# Patient Record
(demographics unavailable — no encounter records)

---

## 2024-11-18 NOTE — PLAN
[FreeTextEntry1] : 44 year old for an annual.  Health care maintenance -pap -menopause hormones drawn  -vit D/exercise/calcium -mammo/sono utd -colon screening reviewed -f/u PCP for annual and appropriate immunizations -rto 1 year

## 2024-11-18 NOTE — END OF VISIT
[FreeTextEntry3] : I, Lou Ann, acted as a scribe on behalf of Dr. Gris Langford M.D. on 11/13/2024.   All medical entries made by the scribe were at my, Dr. Gris Langford M.D., direction and personally dictated by me on 11/13/2024. I have reviewed the chart and agree that the record accurately reflects my personal performance of the history, physical exam, assessment and plan. I have also personally directed, reviewed, and agreed with the chart.

## 2024-11-18 NOTE — HISTORY OF PRESENT ILLNESS
[FreeTextEntry1] : 44 year old  presents for an annual. She is doing well but c/o insomnia and occasional night sweats that began over a year ago. Pt has borderline hypothyroid, follows with endo.  OBHx: SAB (no D&C),  x2 (, ) GYNHx: fibroids, ovarian cysts, h/o abnml PAP s/p colpo (), uses condoms PSHx: S/p TLH, BS, Cysto on 23. FamHx: uterine CA (mother- stage IV dx age 59), CAD (PGF, PGM) SocHx: pt works as  [employer]

## 2024-11-20 NOTE — REVIEW OF SYSTEMS
[Abdominal Pain] : abdominal pain [Nausea] : nausea [Negative] : Heme/Lymph [Constipation] : no constipation [Diarrhea] : diarrhea [Vomiting] : no vomiting [Heartburn] : no heartburn [Melena] : no melena

## 2024-11-20 NOTE — PHYSICAL EXAM
[No Acute Distress] : no acute distress [Well Nourished] : well nourished [Well Developed] : well developed [Well-Appearing] : well-appearing [Normal Sclera/Conjunctiva] : normal sclera/conjunctiva [PERRL] : pupils equal round and reactive to light [EOMI] : extraocular movements intact [Normal Outer Ear/Nose] : the outer ears and nose were normal in appearance [Normal Oropharynx] : the oropharynx was normal [Normal TMs] : both tympanic membranes were normal [No JVD] : no jugular venous distention [No Lymphadenopathy] : no lymphadenopathy [Supple] : supple [Thyroid Normal, No Nodules] : the thyroid was normal and there were no nodules present [No Respiratory Distress] : no respiratory distress  [No Accessory Muscle Use] : no accessory muscle use [Clear to Auscultation] : lungs were clear to auscultation bilaterally [Normal Rate] : normal rate  [Regular Rhythm] : with a regular rhythm [Normal S1, S2] : normal S1 and S2 [No Murmur] : no murmur heard [No Carotid Bruits] : no carotid bruits [Pedal Pulses Present] : the pedal pulses are present [No Edema] : there was no peripheral edema [Soft] : abdomen soft [Non Tender] : non-tender [Non-distended] : non-distended [Normal Bowel Sounds] : normal bowel sounds [Normal Posterior Cervical Nodes] : no posterior cervical lymphadenopathy [Normal Anterior Cervical Nodes] : no anterior cervical lymphadenopathy [No Spinal Tenderness] : no spinal tenderness [No Joint Swelling] : no joint swelling [Grossly Normal Strength/Tone] : grossly normal strength/tone [No Rash] : no rash [Coordination Grossly Intact] : coordination grossly intact [No Focal Deficits] : no focal deficits [Normal Gait] : normal gait [Normal Affect] : the affect was normal [Normal Insight/Judgement] : insight and judgment were intact [de-identified] : + mild left flank tenderness

## 2024-11-20 NOTE — HISTORY OF PRESENT ILLNESS
[de-identified] : Ms. ANA TRIPP is a 44 yr old female with h/o hysterectomy/ low TSH  present for follow-up.   Patient reports pain on lower left abdominal region that travels to upper left quadrant and left flank area over the past few months. 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 Pt was advised to stop eating greasy foods.  Patient has not established care by GI yet. Patient reports has more frequent pain now and difficult to sleep at night given pain, especially in the past three weeks. Followed by GYN, completed blood work, stable, was prescribed Macrobid preventatively yet did not require given culture was negative. Patient is seen by Dr. Pam Crandall every three months for low TSH, she also has found thyroid nodules and completed a thyroid biopsy and thyroid uptake scan> reportedly Nl. S/p hysterectomy last year. Reports intermittent  nausea, no vomiting. Denies any blood in urine and has regular bowel movements. Reports only taking multivitamins.

## 2024-11-20 NOTE — ASSESSMENT
[FreeTextEntry1] : left Flank, LUQ Pain  Referral to URO  Referral for U/s kidney and bladder  Repeat blood work for lipase panel and UA. Low TSH  F/u with ENDO  Mesenteric panniculitis GI follow-up  F/u next week with complete labs and possibly imaging to review.

## 2024-11-20 NOTE — ADDENDUM
[FreeTextEntry1] : I, Marga Santiago, acted as a scribe on behalf of Dr. Linda Del Real MD, on 11/20/2024.   All medical entries made by the scribe were at my, Dr. Linda Del Real MD, direction and personally dictated by me on 11/20/2024. I have reviewed the chart and agree that the record accurately reflects my personal performance of the history, physical exam, assessment and plan. I have also personally directed, reviewed, and agreed with the chart.

## 2024-11-29 NOTE — HISTORY OF PRESENT ILLNESS
[de-identified] : Ms. ANA TRIPP is a 44 year old female with Hx of hysterectomy, low TSH, who presents for a follow up on recent labs.  Recent labs show lipase back WNL,+ elevated platelets. Pt presents to office last visit with abdominal pain. Reports Sx has improved. Pt is experiencing left sided flank pain. US kidney and bladder (11/21/24) shows 5mm rt intrarenal calculus, mild left hydronephrosis. Denies any SOB, CP, N/V/D, headache, dizziness, or leg swelling.

## 2024-11-29 NOTE — PHYSICAL EXAM
[No Acute Distress] : no acute distress [Well Nourished] : well nourished [Well Developed] : well developed [Well-Appearing] : well-appearing [Normal Sclera/Conjunctiva] : normal sclera/conjunctiva [Normal Outer Ear/Nose] : the outer ears and nose were normal in appearance [No JVD] : no jugular venous distention [No Lymphadenopathy] : no lymphadenopathy [Supple] : supple [No Respiratory Distress] : no respiratory distress  [No Accessory Muscle Use] : no accessory muscle use [Clear to Auscultation] : lungs were clear to auscultation bilaterally [Normal Rate] : normal rate  [Regular Rhythm] : with a regular rhythm [Normal S1, S2] : normal S1 and S2 [No Murmur] : no murmur heard [Pedal Pulses Present] : the pedal pulses are present [No Edema] : there was no peripheral edema [No Extremity Clubbing/Cyanosis] : no extremity clubbing/cyanosis [Soft] : abdomen soft [Non Tender] : non-tender [Non-distended] : non-distended [Normal Posterior Cervical Nodes] : no posterior cervical lymphadenopathy [Normal Anterior Cervical Nodes] : no anterior cervical lymphadenopathy [No Spinal Tenderness] : no spinal tenderness [No Joint Swelling] : no joint swelling [Grossly Normal Strength/Tone] : grossly normal strength/tone [No Rash] : no rash [Coordination Grossly Intact] : coordination grossly intact [No Focal Deficits] : no focal deficits [Normal Gait] : normal gait [Normal Affect] : the affect was normal [Normal Insight/Judgement] : insight and judgment were intact [de-identified] : + mild left flank tenderness

## 2024-11-29 NOTE — ADDENDUM
[FreeTextEntry1] : Documented by Stacie Mccoy acting as a scribe for Dr. Del Real. 11/29/2024   All medical record entries made by the scribe were at my, Dr. Del Real, direction and personally dictated by me on 11/29/2024. I have reviewed the chart and agree that the record accurately reflects my personal performance of the history, physical exam, assessment and plan. I have also personally directed, reviewed, and agreed with the chart.

## 2024-11-29 NOTE — PLAN
[FreeTextEntry1] : Follow up  Rt kidney stone, mild left hydronephrosis seen on recent US kidney and bladder  urology follow up -referral given  Elevated platelets hematology follow up we'll repeat CBC with diff today  Blood work ordered. Follow up in one week for lab results

## 2024-11-29 NOTE — PHYSICAL EXAM
[No Acute Distress] : no acute distress [Well Nourished] : well nourished [Well Developed] : well developed [Well-Appearing] : well-appearing [Normal Sclera/Conjunctiva] : normal sclera/conjunctiva [Normal Outer Ear/Nose] : the outer ears and nose were normal in appearance [No JVD] : no jugular venous distention [No Lymphadenopathy] : no lymphadenopathy [Supple] : supple [No Respiratory Distress] : no respiratory distress  [No Accessory Muscle Use] : no accessory muscle use [Clear to Auscultation] : lungs were clear to auscultation bilaterally [Normal Rate] : normal rate  [Regular Rhythm] : with a regular rhythm [Normal S1, S2] : normal S1 and S2 [No Murmur] : no murmur heard [Pedal Pulses Present] : the pedal pulses are present [No Edema] : there was no peripheral edema [No Extremity Clubbing/Cyanosis] : no extremity clubbing/cyanosis [Soft] : abdomen soft [Non Tender] : non-tender [Non-distended] : non-distended [Normal Posterior Cervical Nodes] : no posterior cervical lymphadenopathy [Normal Anterior Cervical Nodes] : no anterior cervical lymphadenopathy [No Spinal Tenderness] : no spinal tenderness [No Joint Swelling] : no joint swelling [Grossly Normal Strength/Tone] : grossly normal strength/tone [No Rash] : no rash [Coordination Grossly Intact] : coordination grossly intact [No Focal Deficits] : no focal deficits [Normal Gait] : normal gait [Normal Affect] : the affect was normal [Normal Insight/Judgement] : insight and judgment were intact [de-identified] : + mild left flank tenderness

## 2024-11-29 NOTE — HISTORY OF PRESENT ILLNESS
[de-identified] : Ms. ANA TRIPP is a 44 year old female with Hx of hysterectomy, low TSH, who presents for a follow up on recent labs.  Recent labs show lipase back WNL,+ elevated platelets. Pt presents to office last visit with abdominal pain. Reports Sx has improved. Pt is experiencing left sided flank pain. US kidney and bladder (11/21/24) shows 5mm rt intrarenal calculus, mild left hydronephrosis. Denies any SOB, CP, N/V/D, headache, dizziness, or leg swelling.

## 2024-12-09 NOTE — ASSESSMENT
[FreeTextEntry1] : Very pleasant 44-year-old woman who presents for evaluation of left hydronephrosis, left flank pain, kidney stones -Prior CT images from [facility] reviewed demonstrating a small left intrarenal stone -Renal ultrasound images reviewed from [facility] radiology demonstrating mild left hydronephrosis -CT urogram -We discussed potential etiologies of left flank pain -We discussed potential etiologies of hydronephrosis -We discussed general preventative strategies for kidney stones -Follow-up in 3 to 4 weeks  Patient is being seen today for evaluation and management of a chronic and longitudinal ongoing condition and I am the primary treating physician

## 2024-12-09 NOTE — HISTORY OF PRESENT ILLNESS
[FreeTextEntry1] : Very pleasant 44-year-old woman who presents for evaluation of left kidney stone, left hydronephrosis, left flank pain.  She previously underwent a CT scan at [facility] which demonstrated a small left kidney stone.  Recently because of flank pain she underwent a renal ultrasound which demonstrated mild left hydronephrosis, no evidence of kidney stones, however.  She presents today to discuss these results as well as further management options.  She denies hematuria.  No dysuria.  No right flank pain.  No nausea or vomiting.

## 2025-01-13 NOTE — ASSESSMENT
[FreeTextEntry1] : Very pleasant 44-year-old woman who presents for follow-up of left hydronephrosis, left flank pain, left kidney stone -Prior CT images from [facility] reviewed demonstrating a small left intrarenal stone -Renal ultrasound images reviewed from [facility] demonstrating mild left hydronephrosis -CT urogram images reviewed demonstrating no ureteral stones nor hydronephrosis, bilaterally, however does demonstrate a 4 mm nonobstructing left lower pole intrarenal stone and nonspecific haziness to the mesenteric root with increased intra-abdominal and mesenteric fat which 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

## 2025-01-13 NOTE — HISTORY OF PRESENT ILLNESS
[FreeTextEntry1] : Very pleasant 44-year-old woman who presents for follow-up of left kidney stone, left hydronephrosis, left flank pain.  She previously underwent a CT scan at [facility] which demonstrated a small left kidney stone.  Recently because of flank pain she underwent a renal ultrasound which demonstrated mild left hydronephrosis, but no evidence of kidney stones, however.    A repeat CT scan demonstrated no ureteral stones nor hydronephrosis but it did demonstrate a 4 mm nonobstructing left lower pole intrarenal stone as well as nonspecific haziness in the mesenteric root with increased intra-abdominal mesenteric fat concerning for zari mesentery she presents today to discuss these findings as well as options for management moving forward.  She denies hematuria.  No dysuria.

## 2025-01-17 NOTE — PHYSICAL EXAM
[Normal Breath Sounds] : Normal breath sounds [Normal Rate and Rhythm] : normal rate and rhythm [2+] : left 2+ [No Rash or Lesion] : No rash or lesion [Alert] : alert [Calm] : calm [Ankle Swelling (On Exam)] : not present [Varicose Veins Of Lower Extremities] : not present [] : not present [Skin Ulcer] : no ulcer [de-identified] : Appears well, no acute distress noted [de-identified] : Intact

## 2025-01-17 NOTE — HISTORY OF PRESENT ILLNESS
[FreeTextEntry1] : Patient is a 44-year-old female with history significant for thrombocytosis who presents to the office today for evaluation of left upper extremity pain and discoloration.  Patient reports 2 episodes of left upper extremity pain that radiates down to the fingers with cyanosis of the hand, once in September 2024 and again in December 2024.  Patient had arterial and venous Dopplers performed on January 3rd of this year which shows no evidence of stenosis in the arteries and no evidence of deep vein thrombosis.  Patient is currently on baby aspirin daily.  No history of MI or CVA.  No history of DVT or PE.  No history of smoking.

## 2025-01-17 NOTE — ASSESSMENT
[FreeTextEntry1] : 44-year-old female with episodic left upper extremity pain, cyanosis and numbness.  Pulses are intact in the upper extremities bilaterally.  Prior arterial Doppler showed no evidence of hemodynamically significant stenosis.  Previous venous Doppler showed no evidence of deep vein thrombosis.  Suspect thoracic outlet syndrome.  Patient to return to office for vascular study.

## 2025-01-17 NOTE — CONSULT LETTER
[Dear  ___] : Dear  [unfilled], [Consult Letter:] : I had the pleasure of evaluating your patient, [unfilled]. [Please see my note below.] : Please see my note below. [Consult Closing:] : Thank you very much for allowing me to participate in the care of this patient.  If you have any questions, please do not hesitate to contact me. [Sincerely,] : Sincerely, [FreeTextEntry3] : Saroj Carcamo M.D., F.GIORGIO., R.P.VERITOI.  of Vascular Surgery Assistant Professor of Radiology Director of Endovascular Program/ [facility]

## 2025-02-07 NOTE — ADDENDUM
[FreeTextEntry1] : Documented by Emili Lynch acting as a scribe for Dr. Del Real. 02/07/2025   All medical record entries made by the scribe were at my, Dr. Del Real, direction and personally dictated by me on 02/07/2025. I have reviewed the chart an agree that the record accurately reflects my personal performance of the history, physical exam, assessment and plan. I have also personally directed, reviewed, and agreed with the chart.

## 2025-02-07 NOTE — PLAN
[FreeTextEntry1] : Follow up   Mesenteric Lipomatosis/ Abd pain  Referred to General Surgery   Rt kidney stone, mild left hydronephrosis seen on recent US kidney and bladder following with Urology, will follow up in 6 months   Elevated platelets following with vascular follow with hematology

## 2025-02-07 NOTE — HISTORY OF PRESENT ILLNESS
[Home] : at home, [unfilled] , at the time of the visit. [Medical Office: ([facility])___] : at the medical office located in  [Verbal consent obtained from patient] : the patient, [unfilled] [de-identified] : Ms. ANA TRIPP is a 45 year old female with Hx of hysterectomy, low TSH, kidney stones, seen in Telemedicine for a follow up on chronic problems.  Pt states she is feeling well. Offers no complaints. Pt followed with Vascular and Urology and states Urology directed her to follow up for repeat US kidney.  US kidney and bladder (11/21/24) revealed rt intrarenal calculus measuring 5mm, mild left hydronephrosis.  CT abdomen 12/28/2024 revealed a 4mm non-obstructing left lower pole intrarenal calculus, and a mesenteric lipomatosis with associated zari mesentery. Pt states she experiences occasional abdominal pain.  Denies any SOB, CP, abdominal pain, N/V/D, headache, dizziness, or leg swelling.

## 2025-05-22 NOTE — ADDENDUM
[FreeTextEntry1] : Documented by Emili Lynch acting as a scribe for Dr. Del Real. 05/22/2025   All medical record entries made by the scribe were at my, Dr. Del Real, direction and personally dictated by me on 05/22/2025. I have reviewed the chart an agree that the record accurately reflects my personal performance of the history, physical exam, assessment and plan. I have also personally directed, reviewed, and agreed with the chart.

## 2025-05-22 NOTE — HEALTH RISK ASSESSMENT
[Good] : ~his/her~  mood as  good [Yes] : Yes [Little interest or pleasure doing things] : 1) Little interest or pleasure doing things [Feeling down, depressed, or hopeless] : 2) Feeling down, depressed, or hopeless [0] : 2) Feeling down, depressed, or hopeless: Not at all (0) [PHQ-2 Negative - No further assessment needed] : PHQ-2 Negative - No further assessment needed [Never] : Never [With Family] : lives with family [# of Members in Household ___] :  household currently consist of [unfilled] member(s) [Employed] : employed [College] : College [] :  [# Of Children ___] : has [unfilled] children [Feels Safe at Home] : Feels safe at home [de-identified] : Occasionally. [de-identified] : Maintains active by working out. [de-identified] : Maintains a healthy diet.  [[id number]] : 0 [MammogramDate] : 06/2024 [PapSmearDate] : s/p hysterectomy [ColonoscopyDate] : Never [ColonoscopyComments] : Pt. requests a referral.

## 2025-05-22 NOTE — PHYSICAL EXAM
[No Acute Distress] : no acute distress [Well Nourished] : well nourished [Well Developed] : well developed [Well-Appearing] : well-appearing [Normal Sclera/Conjunctiva] : normal sclera/conjunctiva [PERRL] : pupils equal round and reactive to light [EOMI] : extraocular movements intact [Normal Outer Ear/Nose] : the outer ears and nose were normal in appearance [Normal Oropharynx] : the oropharynx was normal [Normal TMs] : both tympanic membranes were normal [No JVD] : no jugular venous distention [No Lymphadenopathy] : no lymphadenopathy [Supple] : supple [Thyroid Normal, No Nodules] : the thyroid was normal and there were no nodules present [No Respiratory Distress] : no respiratory distress  [No Accessory Muscle Use] : no accessory muscle use [Clear to Auscultation] : lungs were clear to auscultation bilaterally [Normal Rate] : normal rate  [Regular Rhythm] : with a regular rhythm [Normal S1, S2] : normal S1 and S2 [No Murmur] : no murmur heard [No Carotid Bruits] : no carotid bruits [Pedal Pulses Present] : the pedal pulses are present [No Edema] : there was no peripheral edema [Soft] : abdomen soft [Non Tender] : non-tender [Non-distended] : non-distended [No Masses] : no abdominal mass palpated [Normal Bowel Sounds] : normal bowel sounds [Normal Posterior Cervical Nodes] : no posterior cervical lymphadenopathy [Normal Anterior Cervical Nodes] : no anterior cervical lymphadenopathy [No CVA Tenderness] : no CVA  tenderness [No Spinal Tenderness] : no spinal tenderness [No Joint Swelling] : no joint swelling [Grossly Normal Strength/Tone] : grossly normal strength/tone [No Rash] : no rash [Coordination Grossly Intact] : coordination grossly intact [No Focal Deficits] : no focal deficits [Normal Gait] : normal gait [Normal Affect] : the affect was normal [Normal Insight/Judgement] : insight and judgment were intact

## 2025-05-22 NOTE — PLAN
[FreeTextEntry1] : Annual Physical   Overweight Weight management, healthy food choices, and increased physical activity discussed. we'll check TSH/T4 today  Health Maintenance / Hx Hysterectomy  Scheduled for mammogram  follows with gynecology yearly   Elevated platelets hematology follow up  low TSH/Thryoid nodules follows with endocrinology   Kidney stone  Follows with Urology, Dr. White  mesenteric lipomatosis  Referred to general surgery   Bloodwork ordered.  Follow up in one week for lab results.

## 2025-05-22 NOTE — HISTORY OF PRESENT ILLNESS
[de-identified] : Ms. ANA TRIPP is a 45 year old female with Hx of hysterectomy, low TSH, kidney stones, presenting for an annual physical exam.   Pt is following with gynecology yearly and is scheduled for mammogram.  She exercises 4x weekly, eats healthy, and c/o difficulty losing weight. She follows with endocrinology every 3 months for thyroid nodule/low TSH and goes for US thyroid every 6 months.   Pt has mesenteric lipomatosis revealed on CT abdomen 12/28/24 and c/o intermittent abdominal pain. She also c/o intermittent constipation.  She states she is otherwise feeling well and takes ASA prn. Denies any SOB or CP.

## 2025-05-28 NOTE — PLAN
[FreeTextEntry1] : Annual Physical   Overweight Weight management, healthy food choices, and increased physical activity discussed.  Kidney stone  Follows with Urology, Dr. White  mesenteric lipomatosis  GI follow up   Labs reviewed and discussed with patient.  Dyslipidemia with low HDL Reinforced the importance of following a low cholesterol/low fat diet and increased physical activity  Elevated platelets Follows with Hematology, Dr. Frances   subclinical hyperthyroidism /Thyroid nodules will monitor TSH

## 2025-05-28 NOTE — ADDENDUM
[FreeTextEntry1] : Documented by Emili Lynch acting as a scribe for Dr. Del Real. 05/28/2025   All medical record entries made by the scribe were at my, Dr. Del Real, direction and personally dictated by me on 05/28/2025. I have reviewed the chart an agree that the record accurately reflects my personal performance of the history, physical exam, assessment and plan. I have also personally directed, reviewed, and agreed with the chart.

## 2025-05-28 NOTE — HISTORY OF PRESENT ILLNESS
[Home] : at home, [unfilled] , at the time of the visit. [Medical Office: ([facility])___] : at the medical office located in  [Telehealth (audio & video)] : This visit was provided via telehealth using real-time 2-way audio visual technology. [Verbal consent obtained from patient] : the patient, [unfilled] [de-identified] : Ms. ANA TRIPP is a 45 year old female with Hx of hysterectomy, low TSH, kidney stones, seen in Telemedicine for a follow up on recent labs.   Recent labs revealed low platelets, low HDL , and low TSH, T4 normal.   Pt is following with hematology, Dr. Minh Frances, for low platelets. She is also following with endocrinology, Dr. Crandall, and reports she had a thyroid nodule biopsy which was reportedly benign. She is scheduled for a follow up with endocrinology next month.  Denies any CP, SOB, or abdominal pain.

## 2025-06-19 NOTE — ASSESSMENT
[FreeTextEntry1] : 46 y/o female presents today for colonoscopy consultation. Never had colonoscopy before. Reports occasional constipation and acid reflux. Denies sob, cp or rectal bleeding.  Occasional abdominal pain due to mesenteric lipomatosis, diagnosed by MD Del Real. Patient plans to schedule surgical consultation. Discussed ER visit for abdominal pain 6/2024, CT resulted with mesenteric panniculitis. Denies family hx colon cancer. Reviewed recent lab work performed 5/2025. Will schedule colonoscopy. Follow up results with NP 2-3 months.   Plan: 1. Schedule colonoscopy 2. Follow up results with NP 2-3 months.    Patient was advised to undergo colonoscopy to which they agreed. The procedure will be performed in [facility] with the assistance of an anesthesiologist. The patient was given a colonoscopy preparation prescription and understood the procedure as it was explained to them. The patient was given a booklet distributed by the [employer] explaining the procedure in detail and they understood the risks of the procedure not limited to infection, bleeding, perforation or non- diagnosis of colorectal cancer. Patient was advised that they could not drive home, if they choose to receive sedation.  Further diagnostic and treatment recommendations will be based upon the procedure and any biopsies, if they are taken.  Thank you for allowing me to participate in this patient's health care.

## 2025-06-19 NOTE — PHYSICAL EXAM

## 2025-06-19 NOTE — HISTORY OF PRESENT ILLNESS
[FreeTextEntry1] : 44 y/o female presents today for colonoscopy consultation. Never had colonoscopy before. Reports occasional constipation and acid reflux. Denies sob, cp or rectal bleeding.  Occasional abdominal pain due to mesenteric lipomatosis, diagnosed by MD Del Real. Patient plans to schedule surgical consultation. Discussed ER visit for abdominal pain 6/2024, CT resulted with mesenteric panniculitis. Denies family hx colon cancer. Reviewed recent lab work performed 5/2025. Will schedule colonoscopy. Follow up results with NP 2-3 months.  [de-identified] : 6/4/24: Mesenteric panniculitis.

## 2025-07-14 NOTE — ASSESSMENT
[FreeTextEntry1] : Very pleasant 45-year-old woman who presents for follow-up of left kidney stone -Prior CT images from [facility] reviewed demonstrating a small left intrarenal stone -Renal ultrasound images reviewed from [facility] radiology demonstrating no kidney stones, hydronephrosis, nor renal masses - Repeat renal ultrasound in 6 months and follow-up at that time  Patient is being seen today for evaluation and management of a chronic and longitudinal ongoing condition and I am the primary treating physician

## 2025-07-14 NOTE — HISTORY OF PRESENT ILLNESS
[FreeTextEntry1] : Very pleasant 45-year-old woman who presents for follow-up of left kidney stone.  She previously underwent a CT scan at [facility] which demonstrated a small left kidney stone.  Recently because of flank pain she underwent a renal ultrasound which demonstrated mild left hydronephrosis, but no evidence of kidney stones, however.    A repeat CT scan 12/2024 demonstrated no ureteral stones nor hydronephrosis but it did demonstrate a 4 mm nonobstructing left lower pole intrarenal stone.  She reports that she was in [place] in May 2025 and believes she passed the stone then. She underwent a surveillance renal ultrasound 6/30/2025 which demonstrated no kidney stones.  She presents today to discuss these results as well as further management options.